# Patient Record
Sex: FEMALE | Race: WHITE | URBAN - METROPOLITAN AREA
[De-identification: names, ages, dates, MRNs, and addresses within clinical notes are randomized per-mention and may not be internally consistent; named-entity substitution may affect disease eponyms.]

---

## 2023-07-23 ENCOUNTER — EMERGENCY (EMERGENCY)
Facility: HOSPITAL | Age: 83
LOS: 0 days | Discharge: HOME | End: 2023-07-23
Attending: EMERGENCY MEDICINE
Payer: MEDICARE

## 2023-07-23 VITALS
OXYGEN SATURATION: 99 % | DIASTOLIC BLOOD PRESSURE: 65 MMHG | WEIGHT: 97 LBS | RESPIRATION RATE: 17 BRPM | HEART RATE: 96 BPM | SYSTOLIC BLOOD PRESSURE: 149 MMHG

## 2023-07-23 VITALS — TEMPERATURE: 97 F

## 2023-07-23 DIAGNOSIS — M06.9 RHEUMATOID ARTHRITIS, UNSPECIFIED: ICD-10-CM

## 2023-07-23 DIAGNOSIS — M25.532 PAIN IN LEFT WRIST: ICD-10-CM

## 2023-07-23 DIAGNOSIS — S51.012A LACERATION WITHOUT FOREIGN BODY OF LEFT ELBOW, INITIAL ENCOUNTER: ICD-10-CM
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
## 2023-07-23 DIAGNOSIS — Z85.3 PERSONAL HISTORY OF MALIGNANT NEOPLASM OF BREAST: ICD-10-CM

## 2023-07-23 DIAGNOSIS — I10 ESSENTIAL (PRIMARY) HYPERTENSION: ICD-10-CM
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
## 2023-07-23 DIAGNOSIS — M79.631 PAIN IN RIGHT FOREARM: ICD-10-CM

## 2023-07-23 PROCEDURE — 73110 X-RAY EXAM OF WRIST: CPT | Mod: 26,LT

## 2023-07-23 PROCEDURE — 99284 EMERGENCY DEPT VISIT MOD MDM: CPT | Mod: 25

## 2023-07-23 PROCEDURE — 29125 APPL SHORT ARM SPLINT STATIC: CPT

## 2023-07-23 PROCEDURE — 73090 X-RAY EXAM OF FOREARM: CPT | Mod: 26,RT

## 2023-07-23 PROCEDURE — 72170 X-RAY EXAM OF PELVIS: CPT | Mod: 26

## 2023-07-23 PROCEDURE — 73080 X-RAY EXAM OF ELBOW: CPT | Mod: LT

## 2023-07-23 PROCEDURE — 73110 X-RAY EXAM OF WRIST: CPT | Mod: LT

## 2023-07-23 PROCEDURE — 73090 X-RAY EXAM OF FOREARM: CPT | Mod: RT

## 2023-07-23 PROCEDURE — 29130 APPL FINGER SPLINT STATIC: CPT | Mod: LT

## 2023-07-23 PROCEDURE — 72170 X-RAY EXAM OF PELVIS: CPT

## 2023-07-23 PROCEDURE — 71046 X-RAY EXAM CHEST 2 VIEWS: CPT | Mod: 26

## 2023-07-23 PROCEDURE — 71046 X-RAY EXAM CHEST 2 VIEWS: CPT

## 2023-07-23 PROCEDURE — 73080 X-RAY EXAM OF ELBOW: CPT | Mod: 26,LT

## 2023-07-23 RX ORDER — ACETAMINOPHEN 500 MG
650 TABLET ORAL ONCE
Refills: 0 | Status: COMPLETED | OUTPATIENT
Start: 2023-07-23 | End: 2023-07-23

## 2023-07-23 RX ADMIN — Medication 650 MILLIGRAM(S): at 17:59

## 2023-07-23 NOTE — ED PROVIDER NOTE - PHYSICAL EXAMINATION
Constitutional: Well developed, well nourished. NAD  TRAUMA: ABC intact. GCS 15.   Head: Normocephalic, atraumatic.  Eyes: PERRL. EOMI. No Raccoon eyes.   ENT: No nasal discharge. No septal hematoma. No Sanchez sign. Mucous membranes moist.  Neck: Supple. Painless ROM. No midline tenderness or stepoffs.  Cardiovascular: Normal S1, S2. Regular rate and rhythm. No murmurs, rubs, or gallops.  Pulmonary: Normal respiratory rate and effort. Lungs clear to auscultation bilaterally. No wheezing, rales, or rhonchi.  CHEST: No chest wall tenderness or crepitus.  Abdominal: Soft. Nondistended. Nontender. No rebound, guarding, or rigidity.  BACK: No midline T/L/S tenderness or stepoffs. No saddle paresthesia.  Extremities. Pelvis stable. No traumatic deformities of extremities. Tenderness to bilateral distal radius, tenderness over left thumb at carpometacarpal joint   Skin: Skin tear over left elbow.  Neuro: AAOx3. Strength 5/5 in all extremities. Sensation intact throughout. No focal neurological deficits.  Psych: Normal mood. Normal affect.
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
## 2023-07-23 NOTE — ED ADULT NURSE NOTE - NSFALLRISKINTERV_ED_ALL_ED

## 2023-07-23 NOTE — ED PROVIDER NOTE - CARE PLAN
Principal Discharge DX:	Fall, initial encounter  Secondary Diagnosis:	Bilateral wrist pain  Secondary Diagnosis:	Skin abrasion   1 Principal Discharge DX:	Fall, initial encounter  Secondary Diagnosis:	Left wrist pain

## 2023-07-23 NOTE — ED PROVIDER NOTE - NSPTACCESSSVCSAPPTDETAILS_ED_ALL_ED_FT
patient with likely fracture to left scaphoid bone of wrist- please arrange follow-up with Hand Surgery orthopedist

## 2023-07-23 NOTE — ED PROVIDER NOTE - PATIENT PORTAL LINK FT
You can access the FollowMyHealth Patient Portal offered by [facility] by registering at the following website: [email or web address]. By joining [employer]’s FollowMyHealth portal, you will also be able to view your health information using other applications (apps) compatible with our system.
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
## 2023-07-23 NOTE — ED PROVIDER NOTE - NS ED ATTENDING STATEMENT MOD
This was a shared visit with the MAGDALENA. I reviewed and verified the documentation and independently performed the documented:
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
## 2023-07-23 NOTE — ED PROVIDER NOTE - CARE PROVIDER_API CALL
Yossi Mendez  Orthopaedic Surgery  [address] [address]  Phone: [phone]  Fax: [phone]  Follow Up Time:

## 2023-07-25 ENCOUNTER — APPOINTMENT (OUTPATIENT)
Dept: ORTHOPEDIC SURGERY | Facility: CLINIC | Age: 83
End: 2023-07-25
Payer: MEDICARE

## 2023-07-25 VITALS — WEIGHT: 90 LBS | HEIGHT: 58 IN | BODY MASS INDEX: 18.89 KG/M2

## 2023-07-25 DIAGNOSIS — S63.502A UNSPECIFIED SPRAIN OF LEFT WRIST, INITIAL ENCOUNTER: ICD-10-CM

## 2023-07-25 PROBLEM — Z00.00 ENCOUNTER FOR PREVENTIVE HEALTH EXAMINATION: Status: ACTIVE | Noted: 2023-07-25

## 2023-07-25 PROCEDURE — 99203 OFFICE O/P NEW LOW 30 MIN: CPT
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
## 2023-07-25 NOTE — DISCUSSION/SUMMARY
[de-identified] : At this time I placed her in a cock-up wrist brace.  She can rest and ice the area, Tylenol as needed for pain.  At the end of her visit she was also complaining of some mild back pain.  I discussed with her doing an x-ray of the back but she declined.  We will see her back in a few weeks for repeat evaluation if the pain is not improving. Patient will call me if any other problems or concerns.  Patient verbalized understanding and agreed with the plan, all questions were answered in the office today.\par

## 2023-07-25 NOTE — IMAGING
[de-identified] : On examination of her left wrist she is mildly tender over the distal radius.  No tenderness over the distal ulna.  No tenderness over the snuffbox.  She has some tenderness to palpation of the left thumb.  She is able to open and close her fist, she can flex and extend the wrist, sensation is intact all the fingers, 2+ radial pulse.  No tenderness to palpation over the forearm or the elbow.  She has full range of motion of the elbow.\par \par X-rays taken at the hospital of the left elbow and left wrist reviewed in the office today show some degenerative changes of the left elbow.  No obvious fractures, dislocations, or other bony abnormalities noted.

## 2023-08-03 PROBLEM — I10 ESSENTIAL (PRIMARY) HYPERTENSION: Chronic | Status: ACTIVE | Noted: 2023-07-24

## 2023-08-03 PROBLEM — M06.9 RHEUMATOID ARTHRITIS, UNSPECIFIED: Chronic | Status: ACTIVE | Noted: 2023-07-24

## 2023-08-14 NOTE — ED ADULT NURSE NOTE - TEMPLATE
General
You can access the FollowMyHealth Patient Portal offered by [facility] by registering at the following website: [email or web address]. By joining [employer]’s FollowMyHealth portal, you will also be able to view your health information using other applications (apps) compatible with our system.

## 2023-08-24 ENCOUNTER — APPOINTMENT (OUTPATIENT)
Dept: ORTHOPEDIC SURGERY | Facility: CLINIC | Age: 83
End: 2023-08-24